# Patient Record
Sex: FEMALE | Race: BLACK OR AFRICAN AMERICAN | NOT HISPANIC OR LATINO | URBAN - METROPOLITAN AREA
[De-identification: names, ages, dates, MRNs, and addresses within clinical notes are randomized per-mention and may not be internally consistent; named-entity substitution may affect disease eponyms.]

---

## 2024-01-06 ENCOUNTER — APPOINTMENT (OUTPATIENT)
Dept: RADIOLOGY | Facility: HOSPITAL | Age: 39
End: 2024-01-06
Payer: COMMERCIAL

## 2024-01-06 ENCOUNTER — HOSPITAL ENCOUNTER (EMERGENCY)
Facility: HOSPITAL | Age: 39
Discharge: HOME | End: 2024-01-06
Attending: EMERGENCY MEDICINE
Payer: COMMERCIAL

## 2024-01-06 VITALS
HEIGHT: 64 IN | RESPIRATION RATE: 18 BRPM | DIASTOLIC BLOOD PRESSURE: 107 MMHG | TEMPERATURE: 98 F | HEART RATE: 84 BPM | SYSTOLIC BLOOD PRESSURE: 147 MMHG | OXYGEN SATURATION: 99 % | WEIGHT: 189 LBS | BODY MASS INDEX: 32.27 KG/M2

## 2024-01-06 DIAGNOSIS — N93.9 VAGINAL BLEEDING: Primary | ICD-10-CM

## 2024-01-06 LAB
ALBUMIN SERPL BCP-MCNC: 4.3 G/DL (ref 3.4–5)
ALP SERPL-CCNC: 73 U/L (ref 33–110)
ALT SERPL W P-5'-P-CCNC: 9 U/L (ref 7–45)
ANION GAP SERPL CALC-SCNC: 10 MMOL/L (ref 10–20)
APPEARANCE UR: CLEAR
AST SERPL W P-5'-P-CCNC: 12 U/L (ref 9–39)
B-HCG SERPL-ACNC: <2 MIU/ML
BASOPHILS # BLD AUTO: 0.05 X10*3/UL (ref 0–0.1)
BASOPHILS NFR BLD AUTO: 0.5 %
BILIRUB SERPL-MCNC: 0.2 MG/DL (ref 0–1.2)
BILIRUB UR STRIP.AUTO-MCNC: NEGATIVE MG/DL
BUN SERPL-MCNC: 8 MG/DL (ref 6–23)
CALCIUM SERPL-MCNC: 9.3 MG/DL (ref 8.6–10.3)
CHLORIDE SERPL-SCNC: 103 MMOL/L (ref 98–107)
CO2 SERPL-SCNC: 29 MMOL/L (ref 21–32)
COLOR UR: YELLOW
CREAT SERPL-MCNC: 0.83 MG/DL (ref 0.5–1.05)
EOSINOPHIL # BLD AUTO: 0.18 X10*3/UL (ref 0–0.7)
EOSINOPHIL NFR BLD AUTO: 1.8 %
ERYTHROCYTE [DISTWIDTH] IN BLOOD BY AUTOMATED COUNT: 13.7 % (ref 11.5–14.5)
GFR SERPL CREATININE-BSD FRML MDRD: >90 ML/MIN/1.73M*2
GLUCOSE SERPL-MCNC: 129 MG/DL (ref 74–99)
GLUCOSE UR STRIP.AUTO-MCNC: NEGATIVE MG/DL
HCG UR QL IA.RAPID: NEGATIVE
HCT VFR BLD AUTO: 36.1 % (ref 36–46)
HGB BLD-MCNC: 12.1 G/DL (ref 12–16)
IMM GRANULOCYTES # BLD AUTO: 0.02 X10*3/UL (ref 0–0.7)
IMM GRANULOCYTES NFR BLD AUTO: 0.2 % (ref 0–0.9)
KETONES UR STRIP.AUTO-MCNC: NEGATIVE MG/DL
LEUKOCYTE ESTERASE UR QL STRIP.AUTO: ABNORMAL
LYMPHOCYTES # BLD AUTO: 2.15 X10*3/UL (ref 1.2–4.8)
LYMPHOCYTES NFR BLD AUTO: 22 %
MCH RBC QN AUTO: 26.5 PG (ref 26–34)
MCHC RBC AUTO-ENTMCNC: 33.5 G/DL (ref 32–36)
MCV RBC AUTO: 79 FL (ref 80–100)
MONOCYTES # BLD AUTO: 0.42 X10*3/UL (ref 0.1–1)
MONOCYTES NFR BLD AUTO: 4.3 %
MUCOUS THREADS #/AREA URNS AUTO: ABNORMAL /LPF
NEUTROPHILS # BLD AUTO: 6.96 X10*3/UL (ref 1.2–7.7)
NEUTROPHILS NFR BLD AUTO: 71.2 %
NITRITE UR QL STRIP.AUTO: NEGATIVE
NRBC BLD-RTO: 0 /100 WBCS (ref 0–0)
PH UR STRIP.AUTO: 6 [PH]
PLATELET # BLD AUTO: 377 X10*3/UL (ref 150–450)
POTASSIUM SERPL-SCNC: 3.5 MMOL/L (ref 3.5–5.3)
PROT SERPL-MCNC: 7.4 G/DL (ref 6.4–8.2)
PROT UR STRIP.AUTO-MCNC: NEGATIVE MG/DL
RBC # BLD AUTO: 4.56 X10*6/UL (ref 4–5.2)
RBC # UR STRIP.AUTO: ABNORMAL /UL
RBC #/AREA URNS AUTO: >20 /HPF
SODIUM SERPL-SCNC: 138 MMOL/L (ref 136–145)
SP GR UR STRIP.AUTO: 1.02
SQUAMOUS #/AREA URNS AUTO: ABNORMAL /HPF
UROBILINOGEN UR STRIP.AUTO-MCNC: <2 MG/DL
WBC # BLD AUTO: 9.8 X10*3/UL (ref 4.4–11.3)
WBC #/AREA URNS AUTO: ABNORMAL /HPF

## 2024-01-06 PROCEDURE — 81025 URINE PREGNANCY TEST: CPT | Performed by: SURGERY

## 2024-01-06 PROCEDURE — 80053 COMPREHEN METABOLIC PANEL: CPT | Performed by: EMERGENCY MEDICINE

## 2024-01-06 PROCEDURE — 99284 EMERGENCY DEPT VISIT MOD MDM: CPT | Performed by: EMERGENCY MEDICINE

## 2024-01-06 PROCEDURE — 81025 URINE PREGNANCY TEST: CPT | Performed by: EMERGENCY MEDICINE

## 2024-01-06 PROCEDURE — 85025 COMPLETE CBC W/AUTO DIFF WBC: CPT | Performed by: EMERGENCY MEDICINE

## 2024-01-06 PROCEDURE — 84702 CHORIONIC GONADOTROPIN TEST: CPT | Performed by: EMERGENCY MEDICINE

## 2024-01-06 PROCEDURE — 76830 TRANSVAGINAL US NON-OB: CPT | Mod: FR

## 2024-01-06 PROCEDURE — 76856 US EXAM PELVIC COMPLETE: CPT | Mod: FOREIGN READ | Performed by: RADIOLOGY

## 2024-01-06 PROCEDURE — 76830 TRANSVAGINAL US NON-OB: CPT | Mod: FOREIGN READ | Performed by: RADIOLOGY

## 2024-01-06 PROCEDURE — 81001 URINALYSIS AUTO W/SCOPE: CPT | Performed by: EMERGENCY MEDICINE

## 2024-01-06 PROCEDURE — 36415 COLL VENOUS BLD VENIPUNCTURE: CPT | Performed by: EMERGENCY MEDICINE

## 2024-01-06 ASSESSMENT — PAIN DESCRIPTION - PAIN TYPE: TYPE: ACUTE PAIN

## 2024-01-06 ASSESSMENT — COLUMBIA-SUICIDE SEVERITY RATING SCALE - C-SSRS
6. HAVE YOU EVER DONE ANYTHING, STARTED TO DO ANYTHING, OR PREPARED TO DO ANYTHING TO END YOUR LIFE?: NO
2. HAVE YOU ACTUALLY HAD ANY THOUGHTS OF KILLING YOURSELF?: NO
1. IN THE PAST MONTH, HAVE YOU WISHED YOU WERE DEAD OR WISHED YOU COULD GO TO SLEEP AND NOT WAKE UP?: NO

## 2024-01-06 ASSESSMENT — PAIN SCALES - GENERAL: PAINLEVEL_OUTOF10: 8

## 2024-01-06 ASSESSMENT — PAIN - FUNCTIONAL ASSESSMENT: PAIN_FUNCTIONAL_ASSESSMENT: 0-10

## 2024-01-06 ASSESSMENT — PAIN DESCRIPTION - LOCATION: LOCATION: PELVIS

## 2024-01-06 NOTE — Clinical Note
Dali Ewing was seen and treated in our emergency department on 1/6/2024.  She may return to work on 01/08/2024.       If you have any questions or concerns, please don't hesitate to call.      Jim Grissom MD

## 2024-01-06 NOTE — ED PROVIDER NOTES
Chief Complaint   Patient presents with    Vaginal Bleeding       HPI:   Dali Ewing is an 38 y.o. female presenting with lower abdominal cramping pain since yesterday.  She explains she started her menstrual yesterday and has had extreme lower abdominal cramping that comes in waves.  Denies dysuria hematuria urgency itching burning.  She explains she has a lot of pressure with urination and discomfort and it is relieved after urination.  She explains over the last 4 months she has not gotten her period since August 17 however every month she does experience pelvic pain with her menstrual cycles.  Tylenol does give her relief.  She had 2 negative pregnancy tests at the beginning of December.  She is soaking 4 pads per day.  Is not taking medications no substance use.  Denies fevers chills nausea vomiting diarrhea constipation.     Allergies   Allergen Reactions    Penicillins Swelling   :  History reviewed. No pertinent past medical history.  History reviewed. No pertinent surgical history.  No family history on file.     Physical Exam  Vitals and nursing note reviewed.   Constitutional:       General: She is not in acute distress.     Appearance: She is well-developed.   HENT:      Head: Normocephalic and atraumatic.   Eyes:      Conjunctiva/sclera: Conjunctivae normal.   Cardiovascular:      Rate and Rhythm: Normal rate and regular rhythm.      Heart sounds: No murmur heard.  Pulmonary:      Effort: Pulmonary effort is normal. No respiratory distress.      Breath sounds: Normal breath sounds.   Abdominal:      Palpations: Abdomen is soft.      Tenderness: There is no abdominal tenderness.      Comments: Abdomen was soft.  No palpable masses no distention no rebound rigidity or guarding.  Generalized tenderness in the lower abdomen.  No epigastric tenderness   Musculoskeletal:         General: No swelling.      Cervical back: Neck supple.   Skin:     General: Skin is warm and dry.      Capillary Refill: Capillary  refill takes less than 2 seconds.   Neurological:      General: No focal deficit present.      Mental Status: She is alert and oriented to person, place, and time.   Psychiatric:         Mood and Affect: Mood normal.           VS: As documented in the triage note and EMR flowsheet from this visit were reviewed.    Medical Decision Making: This is a 38-year-old female presenting with abdominal cramping after starting her period yesterday.  Her menstrual cycle has been irregular over the last 4 months.  She has concerns with more vaginal bleeding and abdominal cramping and she is used to with her cycle.  Pelvic exam performed showed 1 clot formation cervical os was closed no lesions or discoloration or discharge.  CBC CMP UA were all benign pregnancy was negative.  Pelvic ultrasound was ordered       Procedures      Patient was handed off to Dr. Patterson for dispo following pelvic ultrasound    Counseling: Spoke with the patient and discussed today´s findings, in addition to providing specific details for the plan of care and expected course.  Patient was given the opportunity to ask questions.    The plan of care today was mutually agreed upon with the patient. The patient and/or family were given the opportunity to ask questions. All questions asked today in the ED were answered to the best of my ability with today's information.      This report was transcribed using voice recognition software.  Every effort was made to ensure accuracy, however, inadvertently computerized transcription errors may be present.       Jim Ozuna PA-C  01/06/24 [id number]

## 2024-01-06 NOTE — ED TRIAGE NOTES
Pt c/o lower pelvic pain and vaginal bleeding. Pt states she has not had a period in 4 months and just started bleeding yesterday.

## 2024-01-07 NOTE — DISCHARGE INSTRUCTIONS
You were seen emergency room today for evaluation of lower abdominal cramping and vaginal bleeding.  Your pelvic exam and ultrasound were reassuring.  Your labs were also reassuring.  Please continue to monitoring the bleeding.  Please return if you have bleeding through 1 pad per hour or more, worsening symptoms include but not limited to worsening bleeding, pain, or if you have any other concerns.  Please follow-up with OB/GYN as previously planned.

## 2024-11-14 ENCOUNTER — HOSPITAL ENCOUNTER (OUTPATIENT)
Dept: RADIOLOGY | Facility: HOSPITAL | Age: 39
Discharge: HOME | End: 2024-11-14
Payer: COMMERCIAL

## 2024-11-14 ENCOUNTER — OFFICE VISIT (OUTPATIENT)
Dept: ORTHOPEDIC SURGERY | Facility: HOSPITAL | Age: 39
End: 2024-11-14
Payer: COMMERCIAL

## 2024-11-14 VITALS — WEIGHT: 184 LBS | BODY MASS INDEX: 31.41 KG/M2 | HEIGHT: 64 IN

## 2024-11-14 DIAGNOSIS — M25.551 GREATER TROCHANTERIC PAIN SYNDROME OF RIGHT LOWER EXTREMITY: ICD-10-CM

## 2024-11-14 DIAGNOSIS — M25.551 RIGHT HIP PAIN: ICD-10-CM

## 2024-11-14 DIAGNOSIS — M54.16 RIGHT LUMBAR RADICULOPATHY: ICD-10-CM

## 2024-11-14 PROCEDURE — 99204 OFFICE O/P NEW MOD 45 MIN: CPT | Performed by: FAMILY MEDICINE

## 2024-11-14 PROCEDURE — 99214 OFFICE O/P EST MOD 30 MIN: CPT | Performed by: FAMILY MEDICINE

## 2024-11-14 PROCEDURE — 3008F BODY MASS INDEX DOCD: CPT | Performed by: FAMILY MEDICINE

## 2024-11-14 PROCEDURE — 72110 X-RAY EXAM L-2 SPINE 4/>VWS: CPT

## 2024-11-14 PROCEDURE — 1036F TOBACCO NON-USER: CPT | Performed by: FAMILY MEDICINE

## 2024-11-14 PROCEDURE — 73502 X-RAY EXAM HIP UNI 2-3 VIEWS: CPT | Mod: RT

## 2024-11-14 RX ORDER — METFORMIN HYDROCHLORIDE 500 MG/1
1 TABLET, EXTENDED RELEASE ORAL
COMMUNITY
Start: 2024-10-09

## 2024-11-14 NOTE — PROGRESS NOTES
** Please excuse any errors in grammar or translation related to this dictation. Voice recognition software was utilized to prepare this document. **    Assessment & Plan:  Dx: 1.  Greater trochanteric pain syndrome  2.  Lumbar radiculopathy    Discussed with patient that the more focal hip pain that radiates through her anterolateral thigh and is aggravated with hip motion is most aligned with greater trochanteric pain syndrome.  However the pain that she experiences radiating past her knee that is associated with paresthesias is more indicative of a component of lumbar radiculopathy.    For both these conditions recommend initial treatment with physical therapy for core and hip strengthening as well as hip mobility program.  Can continue to use OTC analgesics such as Tylenol or ibuprofen for intermittent symptoms.    If after she completes physical therapy continues to have pain can follow-up for reassessment.  Discussed with patient if majority of symptoms are localizing to her lateral hip, may benefit from a trochanteric bursa injection.  However if continues to have radicular symptoms ideally would be seen by ortho spine.    Follow-up as needed.  All questions answered and patient is agreeable to plan of care.      Chief complaint:  Right hip and leg pain    HPI:  39-year-old female presents to the Ortho injury clinic with right lateral hip pain.  Pain has been ongoing for 8 months.  Injured self after attempting to jump over a bench.  Since that time has had intermittent pain in her proximal lateral that will radiate through her anterolateral thigh.  Additionally at times pain will radiate into her foot and be associated with paresthesias.  Has been using OTC analgesics intermittently for pain.  No physical therapy.  She states she is finally seeking medical care as pain has been more persistent the last few weeks and increasing in intensity particularly at the proximal lateral thigh.  Denies saddle  anesthesia.  Denies acute onset fecal or urinary incontinence or retention.    There is no problem list on file for this patient.    No past surgical history on file.  Current Outpatient Medications on File Prior to Visit   Medication Sig Dispense Refill    metFORMIN  mg 24 hr tablet Take 1 tablet (500 mg) by mouth once daily with breakfast.       No current facility-administered medications on file prior to visit.       Exam:  Right hip Exam:  No warmth, erythema or ecchymosis overlying.  Active flexion >90 degrees with normal abduction, adduction, IR and ER.  TTP over greater trochanter,  proximal ITB  [5]/5 strength of hip flexion, abduction, & adduction  SILT  [ - ]Log roll pain, [ - ]FADIR, [ - ]FAWN, [ - ]Stinchfield,  [ - ]Scour  [ - ]Resisted external derotation test      No gross spinal deformity observed.  No midline ttp. No step-offs.  Non-TTP along paraspinals, gluteal muscles, SI joint  Normal flexion and extension. Normal hip flexion.   [5]/5 strength of hip flexion (L2/L3), knee extension (L4), ankle DF (L4), EHL (L5), ankle PF (S1)  SILT in all dermatomal distributions L3-S1  [ - ] Straight leg raise  [ - ] FAWN pain referred to low back      General Exam:  Constitutional - NAD, AAO x 3, conversing appropriately.  HEENT- Normocephalic and atraumatic. No facial deformities. Hearing grossly normal.  Lungs - Breathing non-labored with normal rate. No accessory muscle use.  CV - Extremities warm and well-perfused, brisk capillary refill present.   Neuro - CN II-XII grossly intact.      Results:  Xrays of lumbar spine obtained 11/14/2024 personally interpreted as normal appearance without loss of disc height or acute fracture.     X-rays of right hip obtained 11/14/2024 perse interpreted as normal appearance with preserved joint space.     Lab Results   Component Value Date    HGBA1C 6.4 (A) 10/09/2024    HGBA1C 7.2 (H) 01/16/2024    CREATININE 0.83 01/06/2024    EGFR >90 01/06/2024